# Patient Record
Sex: MALE | Race: WHITE | NOT HISPANIC OR LATINO | URBAN - METROPOLITAN AREA
[De-identification: names, ages, dates, MRNs, and addresses within clinical notes are randomized per-mention and may not be internally consistent; named-entity substitution may affect disease eponyms.]

---

## 2022-10-03 ENCOUNTER — TELEPHONE (OUTPATIENT)
Dept: ALLERGY | Facility: CLINIC | Age: 22
End: 2022-10-03
Payer: COMMERCIAL

## 2022-10-03 NOTE — TELEPHONE ENCOUNTER
----- Message from Magali Waggoner sent at 10/3/2022  2:14 PM CDT -----  Contact: Self [phone]  Patient is returning a phone call.    Who left a message for the patient: nurse    Does patient know what this is regarding:  pt states answering questions    Would you like a call back, or a response through your [facility] portal?:   call back    Comments:

## 2022-10-14 NOTE — TELEPHONE ENCOUNTER
Called pt, no answer, left message on Wilfredo's voice mail that we are awaiting a reply from his provider in [place]. Left the email that Dr Garrett used to communicate with provider in case there is another email that should be used, asked that Wilfredo follow up with him and get back with us

## 2022-10-21 NOTE — TELEPHONE ENCOUNTER
Wilfredo says that he has a letter from his specialist in [place] since we have not heard back from the provider in [place], he is unable to send via portal because he does not have an upcoming appt and patient hs not been seen by Jayden Garrett at [facility], he will email us the letter

## 2022-10-24 ENCOUNTER — PATIENT MESSAGE (OUTPATIENT)
Dept: ALLERGY | Facility: CLINIC | Age: 22
End: 2022-10-24
Payer: COMMERCIAL

## 2022-10-24 DIAGNOSIS — M06.09 RHEUMATOID ARTHRITIS OF MULTIPLE SITES WITH NEGATIVE RHEUMATOID FACTOR: Primary | ICD-10-CM

## 2022-10-24 DIAGNOSIS — Z79.52 LONG TERM (CURRENT) USE OF SYSTEMIC STEROIDS: ICD-10-CM

## 2022-10-24 RX ORDER — IMMUNE GLOBULIN INFUSION (HUMAN) 100 MG/ML
INJECTION, SOLUTION INTRAVENOUS; SUBCUTANEOUS
Qty: 1000 ML | Refills: 6 | Status: SHIPPED | OUTPATIENT
Start: 2022-10-24 | End: 2022-11-10

## 2022-11-10 PROBLEM — M08.20: Status: ACTIVE | Noted: 2022-11-10

## 2022-11-28 DIAGNOSIS — M06.09 RHEUMATOID ARTHRITIS OF MULTIPLE SITES WITH NEGATIVE RHEUMATOID FACTOR: Primary | ICD-10-CM

## 2025-04-29 NOTE — TELEPHONE ENCOUNTER
Spoke with Wilfredo and he provided all insurance info and Care Everywhere has been updated. Please review records from [facility] and [place] and advise, I can see where there is request from monthly infusions bur cannot see a dose, Wilfredo says he has asked for his docs to put something in writing for us. Wilfredo also gave permission for us to talk to mom if we are not able to get in touch with him    TSH is in normal limits will monitor. At 75mcg.  Advised patient to take levothyroxine in the morning with small sip of water with no other medications, no food.

## 2025-05-15 ENCOUNTER — PATIENT MESSAGE (OUTPATIENT)
Dept: PEDIATRIC HEMATOLOGY/ONCOLOGY | Facility: CLINIC | Age: 25
End: 2025-05-15
Payer: COMMERCIAL

## 2025-05-27 ENCOUNTER — EPISODE CHANGES (OUTPATIENT)
Dept: PEDIATRIC HEMATOLOGY/ONCOLOGY | Facility: CLINIC | Age: 25
End: 2025-05-27

## 2025-05-27 RX ORDER — VALACYCLOVIR HYDROCHLORIDE 1 G/1
500 TABLET, FILM COATED ORAL 2 TIMES DAILY
COMMUNITY
Start: 2025-05-20

## 2025-05-27 RX ORDER — SULFAMETHOXAZOLE AND TRIMETHOPRIM 800; 160 MG/1; MG/1
160 TABLET ORAL DAILY
COMMUNITY
Start: 2025-05-20

## 2025-05-27 RX ORDER — GABAPENTIN 300 MG/1
300 CAPSULE ORAL DAILY
COMMUNITY
Start: 2025-05-20

## 2025-05-27 RX ORDER — ETHAMBUTOL HYDROCHLORIDE 400 MG/1
1200 TABLET, FILM COATED ORAL DAILY
COMMUNITY
Start: 2025-05-20

## 2025-05-27 RX ORDER — SERTRALINE HYDROCHLORIDE 50 MG/1
50 TABLET, FILM COATED ORAL DAILY
COMMUNITY
Start: 2025-05-20

## 2025-05-27 RX ORDER — HYDROCORTISONE 10 MG/1
15 TABLET ORAL 2 TIMES DAILY
COMMUNITY
Start: 2025-05-20

## 2025-05-27 RX ORDER — SERTRALINE HYDROCHLORIDE 25 MG/1
25 TABLET, FILM COATED ORAL DAILY
COMMUNITY
Start: 2025-05-20

## 2025-05-27 RX ORDER — LORAZEPAM 1 MG/1
1 TABLET ORAL EVERY 12 HOURS PRN
COMMUNITY
Start: 2025-05-21

## 2025-05-27 RX ORDER — PANTOPRAZOLE SODIUM 40 MG/1
40 TABLET, DELAYED RELEASE ORAL DAILY
COMMUNITY
Start: 2025-05-20

## 2025-05-27 RX ORDER — AZITHROMYCIN 250 MG/1
250 TABLET, FILM COATED ORAL
COMMUNITY
Start: 2025-05-20 | End: 2025-06-06 | Stop reason: SDUPTHER

## 2025-05-27 RX ORDER — ZOLPIDEM TARTRATE 6.25 MG/1
6.25 TABLET, FILM COATED, EXTENDED RELEASE ORAL NIGHTLY
COMMUNITY
Start: 2025-05-20

## 2025-05-27 RX ORDER — NYSTATIN 100000 [USP'U]/ML
100000 SUSPENSION ORAL
COMMUNITY
Start: 2025-04-28

## 2025-05-27 RX ORDER — CLONIDINE HYDROCHLORIDE 0.1 MG/1
0.1 TABLET ORAL 2 TIMES DAILY
COMMUNITY
Start: 2025-05-20 | End: 2025-06-19

## 2025-05-28 ENCOUNTER — TELEPHONE (OUTPATIENT)
Dept: PEDIATRIC HEMATOLOGY/ONCOLOGY | Facility: CLINIC | Age: 25
End: 2025-05-28
Payer: COMMERCIAL

## 2025-05-28 ENCOUNTER — OFFICE VISIT (OUTPATIENT)
Dept: PEDIATRIC HEMATOLOGY/ONCOLOGY | Facility: CLINIC | Age: 25
End: 2025-05-28
Payer: COMMERCIAL

## 2025-05-28 ENCOUNTER — PATIENT MESSAGE (OUTPATIENT)
Dept: PEDIATRIC HEMATOLOGY/ONCOLOGY | Facility: CLINIC | Age: 25
End: 2025-05-28

## 2025-05-28 ENCOUNTER — LAB VISIT (OUTPATIENT)
Dept: LAB | Facility: HOSPITAL | Age: 25
End: 2025-05-28
Attending: FAMILY MEDICINE
Payer: COMMERCIAL

## 2025-05-28 VITALS
SYSTOLIC BLOOD PRESSURE: 128 MMHG | HEART RATE: 59 BPM | WEIGHT: 187.06 LBS | OXYGEN SATURATION: 97 % | BODY MASS INDEX: 26.19 KG/M2 | TEMPERATURE: 98 F | DIASTOLIC BLOOD PRESSURE: 74 MMHG | HEIGHT: 71 IN | RESPIRATION RATE: 18 BRPM

## 2025-05-28 DIAGNOSIS — D84.822 IMMUNOCOMPROMISED STATE ASSOCIATED WITH STEM CELL TRANSPLANT: ICD-10-CM

## 2025-05-28 DIAGNOSIS — Z94.84 IMMUNOCOMPROMISED STATE ASSOCIATED WITH STEM CELL TRANSPLANT: ICD-10-CM

## 2025-05-28 DIAGNOSIS — M08.20 SO-JIA (SYSTEMIC ONSET JUVENILE IDIOPATHIC ARTHRITIS): ICD-10-CM

## 2025-05-28 DIAGNOSIS — Z94.84 HISTORY OF ALLOGENEIC HEMATOPOIETIC STEM CELL TRANSPLANT: ICD-10-CM

## 2025-05-28 DIAGNOSIS — R23.2 FLUSHING: ICD-10-CM

## 2025-05-28 DIAGNOSIS — Z94.84 HISTORY OF ALLOGENEIC HEMATOPOIETIC STEM CELL TRANSPLANT: Primary | ICD-10-CM

## 2025-05-28 DIAGNOSIS — T86.5 COMPLICATIONS OF STEM CELL TRANSPLANT: ICD-10-CM

## 2025-05-28 DIAGNOSIS — L29.9 PRURITUS: ICD-10-CM

## 2025-05-28 DIAGNOSIS — R74.01 ELEVATED TRANSAMINASE LEVEL: ICD-10-CM

## 2025-05-28 LAB
ABSOLUTE EOSINOPHIL (OHS): 0.04 K/UL
ABSOLUTE MONOCYTE (OHS): 0.38 K/UL (ref 0.3–1)
ABSOLUTE NEUTROPHIL COUNT (OHS): 2.76 K/UL (ref 1.8–7.7)
ALBUMIN SERPL BCP-MCNC: 4.4 G/DL (ref 3.5–5.2)
ALP SERPL-CCNC: 105 UNIT/L (ref 40–150)
ALT SERPL W/O P-5'-P-CCNC: 78 UNIT/L (ref 10–44)
ANION GAP (OHS): 8 MMOL/L (ref 8–16)
ANISOCYTOSIS BLD QL SMEAR: SLIGHT
AST SERPL-CCNC: 49 UNIT/L (ref 11–45)
BASOPHILS # BLD AUTO: 0.01 K/UL
BASOPHILS NFR BLD AUTO: 0.3 %
BILIRUB DIRECT SERPL-MCNC: 0.1 MG/DL (ref 0.1–0.3)
BILIRUB SERPL-MCNC: 0.4 MG/DL (ref 0.1–1)
BUN SERPL-MCNC: 14 MG/DL (ref 6–20)
CALCIUM SERPL-MCNC: 9.5 MG/DL (ref 8.7–10.5)
CHLORIDE SERPL-SCNC: 104 MMOL/L (ref 95–110)
CO2 SERPL-SCNC: 26 MMOL/L (ref 23–29)
CREAT SERPL-MCNC: 1 MG/DL (ref 0.5–1.4)
DACRYOCYTES BLD QL SMEAR: NORMAL
ERYTHROCYTE [DISTWIDTH] IN BLOOD BY AUTOMATED COUNT: 14.8 % (ref 11.5–14.5)
GFR SERPLBLD CREATININE-BSD FMLA CKD-EPI: >60 ML/MIN/1.73/M2
GLUCOSE SERPL-MCNC: 83 MG/DL (ref 70–110)
HCT VFR BLD AUTO: 45.4 % (ref 40–54)
HGB BLD-MCNC: 14.7 GM/DL (ref 14–18)
IMM GRANULOCYTES # BLD AUTO: 0.01 K/UL (ref 0–0.04)
IMM GRANULOCYTES NFR BLD AUTO: 0.3 % (ref 0–0.5)
LDH SERPL-CCNC: 387 U/L (ref 110–260)
LYMPHOCYTES # BLD AUTO: 0.53 K/UL (ref 1–4.8)
MAGNESIUM SERPL-MCNC: 2.1 MG/DL (ref 1.6–2.6)
MCH RBC QN AUTO: 28.7 PG (ref 27–31)
MCHC RBC AUTO-ENTMCNC: 32.4 G/DL (ref 32–36)
MCV RBC AUTO: 89 FL (ref 82–98)
NUCLEATED RBC (/100WBC) (OHS): 0 /100 WBC
OVALOCYTES BLD QL SMEAR: NORMAL
PHOSPHATE SERPL-MCNC: 4 MG/DL (ref 2.7–4.5)
PLATELET # BLD AUTO: 160 K/UL (ref 150–450)
PMV BLD AUTO: 10.1 FL (ref 9.2–12.9)
POIKILOCYTOSIS BLD QL SMEAR: SLIGHT
POTASSIUM SERPL-SCNC: 5.2 MMOL/L (ref 3.5–5.1)
PROT SERPL-MCNC: 7.8 GM/DL (ref 6–8.4)
RBC # BLD AUTO: 5.13 M/UL (ref 4.6–6.2)
RELATIVE EOSINOPHIL (OHS): 1.1 %
RELATIVE LYMPHOCYTE (OHS): 14.2 % (ref 18–48)
RELATIVE MONOCYTE (OHS): 10.2 % (ref 4–15)
RELATIVE NEUTROPHIL (OHS): 73.9 % (ref 38–73)
RETICS/RBC NFR AUTO: 1.8 % (ref 0.4–2)
SODIUM SERPL-SCNC: 138 MMOL/L (ref 136–145)
SPHEROCYTES BLD QL SMEAR: NORMAL
WBC # BLD AUTO: 3.73 K/UL (ref 3.9–12.7)

## 2025-05-28 PROCEDURE — 1159F MED LIST DOCD IN RCRD: CPT | Mod: CPTII,S$GLB,, | Performed by: PEDIATRICS

## 2025-05-28 PROCEDURE — 3074F SYST BP LT 130 MM HG: CPT | Mod: CPTII,S$GLB,, | Performed by: PEDIATRICS

## 2025-05-28 PROCEDURE — 84100 ASSAY OF PHOSPHORUS: CPT

## 2025-05-28 PROCEDURE — 85045 AUTOMATED RETICULOCYTE COUNT: CPT

## 2025-05-28 PROCEDURE — 99205 OFFICE O/P NEW HI 60 MIN: CPT | Mod: S$GLB,,, | Performed by: PEDIATRICS

## 2025-05-28 PROCEDURE — 84155 ASSAY OF PROTEIN SERUM: CPT

## 2025-05-28 PROCEDURE — 99417 PROLNG OP E/M EACH 15 MIN: CPT | Mod: S$GLB,,, | Performed by: PEDIATRICS

## 2025-05-28 PROCEDURE — 36415 COLL VENOUS BLD VENIPUNCTURE: CPT

## 2025-05-28 PROCEDURE — 3078F DIAST BP <80 MM HG: CPT | Mod: CPTII,S$GLB,, | Performed by: PEDIATRICS

## 2025-05-28 PROCEDURE — 82248 BILIRUBIN DIRECT: CPT

## 2025-05-28 PROCEDURE — 83615 LACTATE (LD) (LDH) ENZYME: CPT

## 2025-05-28 PROCEDURE — 99999 PR PBB SHADOW E&M-EST. PATIENT-LVL V: CPT | Mod: PBBFAC,,, | Performed by: PEDIATRICS

## 2025-05-28 PROCEDURE — 3008F BODY MASS INDEX DOCD: CPT | Mod: CPTII,S$GLB,, | Performed by: PEDIATRICS

## 2025-05-28 PROCEDURE — 1160F RVW MEDS BY RX/DR IN RCRD: CPT | Mod: CPTII,S$GLB,, | Performed by: PEDIATRICS

## 2025-05-28 PROCEDURE — G2211 COMPLEX E/M VISIT ADD ON: HCPCS | Mod: S$GLB,,, | Performed by: PEDIATRICS

## 2025-05-28 PROCEDURE — 83735 ASSAY OF MAGNESIUM: CPT

## 2025-05-28 PROCEDURE — 85025 COMPLETE CBC W/AUTO DIFF WBC: CPT

## 2025-05-28 NOTE — TELEPHONE ENCOUNTER
Called and spoke to pharmacy Greta ramos.  Explained to her that Wilfredo wanted all of his prescriptions transferred from [facility] outpatient pharmacy. Gave her the phone # to [facility]'s outpt pharmacy, [phone].  She stated that they would reach out to [facility] to get all prescriptions transferred over.

## 2025-06-02 ENCOUNTER — PATIENT MESSAGE (OUTPATIENT)
Dept: PEDIATRIC HEMATOLOGY/ONCOLOGY | Facility: CLINIC | Age: 25
End: 2025-06-02
Payer: COMMERCIAL

## 2025-06-02 DIAGNOSIS — Z94.84 HISTORY OF ALLOGENEIC HEMATOPOIETIC STEM CELL TRANSPLANT: ICD-10-CM

## 2025-06-02 DIAGNOSIS — M08.20 SO-JIA (SYSTEMIC ONSET JUVENILE IDIOPATHIC ARTHRITIS): Primary | ICD-10-CM

## 2025-06-02 RX ORDER — ZOLPIDEM TARTRATE 6.25 MG/1
6.25 TABLET, FILM COATED, EXTENDED RELEASE ORAL NIGHTLY PRN
Qty: 30 TABLET | Refills: 1 | Status: SHIPPED | OUTPATIENT
Start: 2025-06-02 | End: 2025-12-01

## 2025-06-06 DIAGNOSIS — M08.20 SO-JIA (SYSTEMIC ONSET JUVENILE IDIOPATHIC ARTHRITIS): ICD-10-CM

## 2025-06-06 DIAGNOSIS — Z94.84 HISTORY OF ALLOGENEIC HEMATOPOIETIC STEM CELL TRANSPLANT: Primary | ICD-10-CM

## 2025-06-06 RX ORDER — AZITHROMYCIN 250 MG/1
250 TABLET, FILM COATED ORAL DAILY
Qty: 30 TABLET | Refills: 2 | Status: SHIPPED | OUTPATIENT
Start: 2025-06-06